# Patient Record
Sex: MALE | Race: WHITE | NOT HISPANIC OR LATINO | Employment: FULL TIME | URBAN - METROPOLITAN AREA
[De-identification: names, ages, dates, MRNs, and addresses within clinical notes are randomized per-mention and may not be internally consistent; named-entity substitution may affect disease eponyms.]

---

## 2018-02-28 ENCOUNTER — COMMUNICATION - HEALTHEAST (OUTPATIENT)
Dept: FAMILY MEDICINE | Facility: CLINIC | Age: 53
End: 2018-02-28

## 2018-03-15 ENCOUNTER — RECORDS - HEALTHEAST (OUTPATIENT)
Dept: ADMINISTRATIVE | Facility: OTHER | Age: 53
End: 2018-03-15

## 2018-04-06 ENCOUNTER — OFFICE VISIT - HEALTHEAST (OUTPATIENT)
Dept: FAMILY MEDICINE | Facility: CLINIC | Age: 53
End: 2018-04-06

## 2018-04-06 DIAGNOSIS — T63.461A WASP STING-INDUCED ANAPHYLAXIS: ICD-10-CM

## 2018-04-06 DIAGNOSIS — E29.1 HYPOGONADISM MALE: ICD-10-CM

## 2018-04-06 DIAGNOSIS — S09.93XS: ICD-10-CM

## 2018-04-06 DIAGNOSIS — M21.611 BUNION OF RIGHT FOOT: ICD-10-CM

## 2018-04-06 DIAGNOSIS — Z00.00 ROUTINE GENERAL MEDICAL EXAMINATION AT A HEALTH CARE FACILITY: ICD-10-CM

## 2018-04-06 DIAGNOSIS — T78.2XXA WASP STING-INDUCED ANAPHYLAXIS: ICD-10-CM

## 2018-04-06 ASSESSMENT — MIFFLIN-ST. JEOR: SCORE: 1369.7

## 2018-04-09 ENCOUNTER — AMBULATORY - HEALTHEAST (OUTPATIENT)
Dept: LAB | Facility: CLINIC | Age: 53
End: 2018-04-09

## 2018-04-09 DIAGNOSIS — E29.1 HYPOGONADISM MALE: ICD-10-CM

## 2018-04-09 DIAGNOSIS — Z00.00 ROUTINE GENERAL MEDICAL EXAMINATION AT A HEALTH CARE FACILITY: ICD-10-CM

## 2018-04-09 LAB
ALBUMIN SERPL-MCNC: 4.1 G/DL (ref 3.5–5)
ALP SERPL-CCNC: 58 U/L (ref 45–120)
ALT SERPL W P-5'-P-CCNC: 17 U/L (ref 0–45)
ANION GAP SERPL CALCULATED.3IONS-SCNC: 11 MMOL/L (ref 5–18)
AST SERPL W P-5'-P-CCNC: 21 U/L (ref 0–40)
BILIRUB SERPL-MCNC: 0.6 MG/DL (ref 0–1)
BUN SERPL-MCNC: 13 MG/DL (ref 8–22)
CALCIUM SERPL-MCNC: 9.8 MG/DL (ref 8.5–10.5)
CHLORIDE BLD-SCNC: 102 MMOL/L (ref 98–107)
CHOLEST SERPL-MCNC: 187 MG/DL
CO2 SERPL-SCNC: 26 MMOL/L (ref 22–31)
CREAT SERPL-MCNC: 0.81 MG/DL (ref 0.7–1.3)
ERYTHROCYTE [DISTWIDTH] IN BLOOD BY AUTOMATED COUNT: 12.1 % (ref 11–14.5)
FASTING STATUS PATIENT QL REPORTED: YES
FSH SERPL-ACNC: 19.4 MIU/ML (ref 0–12)
GFR SERPL CREATININE-BSD FRML MDRD: >60 ML/MIN/1.73M2
GLUCOSE BLD-MCNC: 81 MG/DL (ref 70–125)
HCT VFR BLD AUTO: 40.6 % (ref 40–54)
HDLC SERPL-MCNC: 66 MG/DL
HGB BLD-MCNC: 14 G/DL (ref 14–18)
LDLC SERPL CALC-MCNC: 101 MG/DL
LH SERPL-ACNC: 6.9 MIU/ML
MCH RBC QN AUTO: 30.8 PG (ref 27–34)
MCHC RBC AUTO-ENTMCNC: 34.5 G/DL (ref 32–36)
MCV RBC AUTO: 89 FL (ref 80–100)
PLATELET # BLD AUTO: 252 THOU/UL (ref 140–440)
PMV BLD AUTO: 7.3 FL (ref 7–10)
POTASSIUM BLD-SCNC: 4.1 MMOL/L (ref 3.5–5)
PROT SERPL-MCNC: 6.6 G/DL (ref 6–8)
PSA SERPL-MCNC: 2.7 NG/ML (ref 0–3.5)
RBC # BLD AUTO: 4.55 MILL/UL (ref 4.4–6.2)
SODIUM SERPL-SCNC: 139 MMOL/L (ref 136–145)
TRIGL SERPL-MCNC: 99 MG/DL
TSH SERPL DL<=0.005 MIU/L-ACNC: 3.09 UIU/ML (ref 0.3–5)
WBC: 5.1 THOU/UL (ref 4–11)

## 2018-04-10 LAB — 25(OH)D3 SERPL-MCNC: 83.7 NG/ML (ref 30–80)

## 2018-04-12 LAB
TESTOST SERPL-MCNC: 449 NG/DL (ref 240–950)
TESTOST SERPL-MCNC: 450 NG/DL (ref 240–950)
TESTOSTERONE, BIOAVAILABLE, S: 63 NG/DL (ref 50–190)
TESTOSTERONE, FREE, S - HISTORICAL: 9 NG/DL (ref 4.06–15.6)

## 2018-04-13 ENCOUNTER — COMMUNICATION - HEALTHEAST (OUTPATIENT)
Dept: FAMILY MEDICINE | Facility: CLINIC | Age: 53
End: 2018-04-13

## 2018-05-02 ENCOUNTER — RECORDS - HEALTHEAST (OUTPATIENT)
Dept: ADMINISTRATIVE | Facility: OTHER | Age: 53
End: 2018-05-02

## 2018-06-04 ENCOUNTER — RECORDS - HEALTHEAST (OUTPATIENT)
Dept: ADMINISTRATIVE | Facility: OTHER | Age: 53
End: 2018-06-04

## 2018-06-07 ENCOUNTER — COMMUNICATION - HEALTHEAST (OUTPATIENT)
Dept: FAMILY MEDICINE | Facility: CLINIC | Age: 53
End: 2018-06-07

## 2018-06-27 ENCOUNTER — OFFICE VISIT - HEALTHEAST (OUTPATIENT)
Dept: FAMILY MEDICINE | Facility: CLINIC | Age: 53
End: 2018-06-27

## 2018-06-27 DIAGNOSIS — Z01.818 PREOP GENERAL PHYSICAL EXAM: ICD-10-CM

## 2018-06-27 DIAGNOSIS — D10.1 FIBROMA OF TONGUE: ICD-10-CM

## 2018-06-27 ASSESSMENT — MIFFLIN-ST. JEOR: SCORE: 1349.29

## 2018-07-12 ENCOUNTER — RECORDS - HEALTHEAST (OUTPATIENT)
Dept: ADMINISTRATIVE | Facility: OTHER | Age: 53
End: 2018-07-12

## 2018-09-05 ENCOUNTER — OFFICE VISIT - HEALTHEAST (OUTPATIENT)
Dept: FAMILY MEDICINE | Facility: CLINIC | Age: 53
End: 2018-09-05

## 2018-09-05 DIAGNOSIS — M20.5X1 HALLUX LIMITUS OF RIGHT FOOT: ICD-10-CM

## 2018-09-05 DIAGNOSIS — Z01.818 PREOPERATIVE EXAMINATION: ICD-10-CM

## 2018-09-05 ASSESSMENT — MIFFLIN-ST. JEOR: SCORE: 1339.31

## 2018-09-06 ENCOUNTER — COMMUNICATION - HEALTHEAST (OUTPATIENT)
Dept: ADMINISTRATIVE | Facility: CLINIC | Age: 53
End: 2018-09-06

## 2018-09-19 ENCOUNTER — RECORDS - HEALTHEAST (OUTPATIENT)
Dept: ADMINISTRATIVE | Facility: OTHER | Age: 53
End: 2018-09-19

## 2020-11-24 ENCOUNTER — COMMUNICATION - HEALTHEAST (OUTPATIENT)
Dept: SCHEDULING | Facility: CLINIC | Age: 55
End: 2020-11-24

## 2020-11-25 ENCOUNTER — OFFICE VISIT - HEALTHEAST (OUTPATIENT)
Dept: FAMILY MEDICINE | Facility: CLINIC | Age: 55
End: 2020-11-25

## 2020-11-25 DIAGNOSIS — K57.32 DIVERTICULITIS OF COLON: ICD-10-CM

## 2020-12-01 ENCOUNTER — COMMUNICATION - HEALTHEAST (OUTPATIENT)
Dept: SCHEDULING | Facility: CLINIC | Age: 55
End: 2020-12-01

## 2020-12-02 ENCOUNTER — AMBULATORY - HEALTHEAST (OUTPATIENT)
Dept: LAB | Facility: HOSPITAL | Age: 55
End: 2020-12-02

## 2020-12-02 LAB
C DIFF TOX B STL QL: NEGATIVE
RIBOTYPE 027/NAP1/BI: NORMAL

## 2020-12-05 LAB — BACTERIA SPEC CULT: NORMAL

## 2021-01-07 ENCOUNTER — OFFICE VISIT - HEALTHEAST (OUTPATIENT)
Dept: FAMILY MEDICINE | Facility: CLINIC | Age: 56
End: 2021-01-07

## 2021-01-07 DIAGNOSIS — K57.32 DIVERTICULITIS OF COLON: ICD-10-CM

## 2021-01-07 DIAGNOSIS — Z85.828 HX OF SKIN CANCER, BASAL CELL: ICD-10-CM

## 2021-01-07 DIAGNOSIS — Z00.00 ROUTINE GENERAL MEDICAL EXAMINATION AT A HEALTH CARE FACILITY: ICD-10-CM

## 2021-01-07 DIAGNOSIS — E55.9 VITAMIN D DEFICIENCY: ICD-10-CM

## 2021-01-07 DIAGNOSIS — K64.9 HEMORRHOIDS, UNSPECIFIED HEMORRHOID TYPE: ICD-10-CM

## 2021-01-07 DIAGNOSIS — T78.2XXD ANAPHYLACTIC REACTION TO WASP STING, UNDETERMINED INTENT, SUBSEQUENT ENCOUNTER: ICD-10-CM

## 2021-01-07 DIAGNOSIS — N28.1 ACQUIRED CYST OF KIDNEY: ICD-10-CM

## 2021-01-07 DIAGNOSIS — E29.1 HYPOGONADISM MALE: ICD-10-CM

## 2021-01-07 DIAGNOSIS — R19.5 LOOSE STOOLS: ICD-10-CM

## 2021-01-07 DIAGNOSIS — T63.464D ANAPHYLACTIC REACTION TO WASP STING, UNDETERMINED INTENT, SUBSEQUENT ENCOUNTER: ICD-10-CM

## 2021-01-07 RX ORDER — EPINEPHRINE 0.3 MG/.3ML
0.3 INJECTION SUBCUTANEOUS PRN
Qty: 2 | Refills: 1 | Status: SHIPPED | OUTPATIENT
Start: 2021-01-07

## 2021-01-07 ASSESSMENT — MIFFLIN-ST. JEOR: SCORE: 1342.03

## 2021-01-12 ENCOUNTER — AMBULATORY - HEALTHEAST (OUTPATIENT)
Dept: LAB | Facility: CLINIC | Age: 56
End: 2021-01-12

## 2021-01-12 DIAGNOSIS — E29.1 HYPOGONADISM MALE: ICD-10-CM

## 2021-01-12 DIAGNOSIS — E55.9 VITAMIN D DEFICIENCY: ICD-10-CM

## 2021-01-12 DIAGNOSIS — Z00.00 ROUTINE GENERAL MEDICAL EXAMINATION AT A HEALTH CARE FACILITY: ICD-10-CM

## 2021-01-12 DIAGNOSIS — R19.5 LOOSE STOOLS: ICD-10-CM

## 2021-01-12 LAB
ALBUMIN SERPL-MCNC: 4.1 G/DL (ref 3.5–5)
ALP SERPL-CCNC: 57 U/L (ref 45–120)
ALT SERPL W P-5'-P-CCNC: 32 U/L (ref 0–45)
ANION GAP SERPL CALCULATED.3IONS-SCNC: 9 MMOL/L (ref 5–18)
AST SERPL W P-5'-P-CCNC: 30 U/L (ref 0–40)
BILIRUB SERPL-MCNC: 0.7 MG/DL (ref 0–1)
BUN SERPL-MCNC: 16 MG/DL (ref 8–22)
CALCIUM SERPL-MCNC: 9.6 MG/DL (ref 8.5–10.5)
CHLORIDE BLD-SCNC: 102 MMOL/L (ref 98–107)
CHOLEST SERPL-MCNC: 216 MG/DL
CO2 SERPL-SCNC: 29 MMOL/L (ref 22–31)
CREAT SERPL-MCNC: 0.96 MG/DL (ref 0.7–1.3)
ERYTHROCYTE [DISTWIDTH] IN BLOOD BY AUTOMATED COUNT: 11.5 % (ref 11–14.5)
FASTING STATUS PATIENT QL REPORTED: YES
GFR SERPL CREATININE-BSD FRML MDRD: >60 ML/MIN/1.73M2
GLUCOSE BLD-MCNC: 83 MG/DL (ref 70–125)
HCT VFR BLD AUTO: 41.7 % (ref 40–54)
HDLC SERPL-MCNC: 75 MG/DL
HGB BLD-MCNC: 14.3 G/DL (ref 14–18)
LDLC SERPL CALC-MCNC: 124 MG/DL
MCH RBC QN AUTO: 31.8 PG (ref 27–34)
MCHC RBC AUTO-ENTMCNC: 34.3 G/DL (ref 32–36)
MCV RBC AUTO: 93 FL (ref 80–100)
PLATELET # BLD AUTO: 273 THOU/UL (ref 140–440)
PMV BLD AUTO: 6.8 FL (ref 7–10)
POTASSIUM BLD-SCNC: 4.7 MMOL/L (ref 3.5–5)
PROT SERPL-MCNC: 6.5 G/DL (ref 6–8)
RBC # BLD AUTO: 4.5 MILL/UL (ref 4.4–6.2)
SODIUM SERPL-SCNC: 140 MMOL/L (ref 136–145)
TRIGL SERPL-MCNC: 85 MG/DL
TSH SERPL DL<=0.005 MIU/L-ACNC: 2.72 UIU/ML (ref 0.3–5)
WBC: 3.9 THOU/UL (ref 4–11)

## 2021-01-13 LAB — 25(OH)D3 SERPL-MCNC: 49.8 NG/ML (ref 30–80)

## 2021-01-14 LAB
SHBG SERPL-SCNC: 40 NMOL/L (ref 11–80)
TESTOST FREE SERPL-MCNC: 8.6 NG/DL (ref 4.7–24.4)
TESTOST SERPL-MCNC: 467 NG/DL (ref 240–950)

## 2021-01-15 ENCOUNTER — COMMUNICATION - HEALTHEAST (OUTPATIENT)
Dept: FAMILY MEDICINE | Facility: CLINIC | Age: 56
End: 2021-01-15

## 2021-01-26 ENCOUNTER — COMMUNICATION - HEALTHEAST (OUTPATIENT)
Dept: FAMILY MEDICINE | Facility: CLINIC | Age: 56
End: 2021-01-26

## 2021-01-27 ENCOUNTER — COMMUNICATION - HEALTHEAST (OUTPATIENT)
Dept: LAB | Facility: CLINIC | Age: 56
End: 2021-01-27

## 2021-02-08 ENCOUNTER — RECORDS - HEALTHEAST (OUTPATIENT)
Dept: ADMINISTRATIVE | Facility: OTHER | Age: 56
End: 2021-02-08

## 2021-03-02 ENCOUNTER — RECORDS - HEALTHEAST (OUTPATIENT)
Dept: ADMINISTRATIVE | Facility: OTHER | Age: 56
End: 2021-03-02

## 2021-04-06 ENCOUNTER — IMMUNIZATION (OUTPATIENT)
Dept: NURSING | Facility: CLINIC | Age: 56
End: 2021-04-06
Payer: COMMERCIAL

## 2021-04-06 PROCEDURE — 91300 PR COVID VAC PFIZER DIL RECON 30 MCG/0.3 ML IM: CPT

## 2021-04-06 PROCEDURE — 0001A PR COVID VAC PFIZER DIL RECON 30 MCG/0.3 ML IM: CPT

## 2021-04-20 ENCOUNTER — RECORDS - HEALTHEAST (OUTPATIENT)
Dept: ADMINISTRATIVE | Facility: OTHER | Age: 56
End: 2021-04-20

## 2021-04-27 ENCOUNTER — IMMUNIZATION (OUTPATIENT)
Dept: NURSING | Facility: CLINIC | Age: 56
End: 2021-04-27
Attending: INTERNAL MEDICINE
Payer: COMMERCIAL

## 2021-04-27 PROCEDURE — 0002A PR COVID VAC PFIZER DIL RECON 30 MCG/0.3 ML IM: CPT

## 2021-04-27 PROCEDURE — 91300 PR COVID VAC PFIZER DIL RECON 30 MCG/0.3 ML IM: CPT

## 2021-05-05 ENCOUNTER — RECORDS - HEALTHEAST (OUTPATIENT)
Dept: ADMINISTRATIVE | Facility: OTHER | Age: 56
End: 2021-05-05

## 2021-05-11 ENCOUNTER — RECORDS - HEALTHEAST (OUTPATIENT)
Dept: ADMINISTRATIVE | Facility: OTHER | Age: 56
End: 2021-05-11

## 2021-05-23 ENCOUNTER — HEALTH MAINTENANCE LETTER (OUTPATIENT)
Age: 56
End: 2021-05-23

## 2021-05-28 ENCOUNTER — RECORDS - HEALTHEAST (OUTPATIENT)
Dept: ADMINISTRATIVE | Facility: CLINIC | Age: 56
End: 2021-05-28

## 2021-06-01 ENCOUNTER — RECORDS - HEALTHEAST (OUTPATIENT)
Dept: ADMINISTRATIVE | Facility: CLINIC | Age: 56
End: 2021-06-01

## 2021-06-01 VITALS — BODY MASS INDEX: 19.23 KG/M2 | WEIGHT: 122.5 LBS | HEIGHT: 67 IN

## 2021-06-01 VITALS — BODY MASS INDEX: 19.93 KG/M2 | WEIGHT: 127 LBS | HEIGHT: 67 IN

## 2021-06-01 VITALS — HEIGHT: 67 IN | WEIGHT: 120.3 LBS | BODY MASS INDEX: 18.88 KG/M2

## 2021-06-05 VITALS
OXYGEN SATURATION: 99 % | HEART RATE: 57 BPM | WEIGHT: 122.13 LBS | DIASTOLIC BLOOD PRESSURE: 67 MMHG | BODY MASS INDEX: 19.13 KG/M2 | SYSTOLIC BLOOD PRESSURE: 124 MMHG

## 2021-06-05 VITALS
BODY MASS INDEX: 18.98 KG/M2 | HEART RATE: 62 BPM | DIASTOLIC BLOOD PRESSURE: 70 MMHG | WEIGHT: 120.9 LBS | SYSTOLIC BLOOD PRESSURE: 138 MMHG | HEIGHT: 67 IN

## 2021-06-13 NOTE — TELEPHONE ENCOUNTER
RN Triage:     Patient is calling in.   Seen on 11/25/20 for diverticulitis. Prescribed antibiotics and be seen again if not better.   Per patient he feels worse starting yesterday. He stated the left lower pain is more pronounced and more diarrhea. Abdominal pain is little stabs of pain that come and goes, that started on Saturday morning. He is having 3-4 episodes of diarrhea per day. NO blood or black tarry BMs.  Yesterday he started feeling overall not well. Chills and feeling ill. NO fever. NO vomiting, some nausea that he stated is mild and comes in waves. He is on a liquid diet again. Some shortness of breath that started yesterday. Tightness in the chest at rest that is constant, he stated it is an uneasy feeling, pain is center chest and is not made worse with exertion, it is present constantly. Patient stated dull pain in the chest. NO pain with deep breaths. Slightly labored breathing at rest, he stated not breathing harder or faster. NOt feeling anxious. Urinating fine, when bladder is full the pain in the left lower quadrant gets worse. Some pain that radiates into the scrotum but not constant. He stated he felt lightheaded starting yesterday with standing and feeling lightheaded today. He stated he is concerned about sepsis.      Per RN protocol patient should be seen in the ED. Patient agreed.   Advised to have someone drive him and wear a mask.     Rachel Swanson RN, BSN [facility] Triage Nurse    Reason for Disposition    Chest pain or pressure    MILD difficulty breathing (e.g., minimal/no SOB at rest, SOB with walking, pulse <100)    Patient wants to be seen    Difficulty breathing    Additional Information    Negative: SEVERE difficulty breathing (e.g., struggling for each breath, speaks in single words)    Negative: Difficult to awaken or acting confused (e.g., disoriented, slurred speech)    Negative: Bluish (or gray) lips or face now    Negative: Shock suspected (e.g., cold/pale/clammy  skin, too weak to stand, low BP, rapid pulse)    Negative: Sounds like a life-threatening emergency to the triager    Negative: [1] COVID-19 exposure AND [2] no symptoms    Negative: COVID-19 and Breastfeeding, questions about    Negative: [1] Adult with possible COVID-19 symptoms AND [2] triager concerned about severity of symptoms or other causes    Negative: Patient sounds very sick or weak to the triager    Negative: SEVERE or constant chest pain or pressure (Exception: mild central chest pain, present only when coughing)    Negative: MODERATE difficulty breathing (e.g., speaks in phrases, SOB even at rest, pulse 100-120)    Negative: Chest pain    Negative: Pain is mainly in upper abdomen (if needed ask: 'is it mainly above the belly button?')    Negative: Passed out (i.e., fainted, collapsed and was not responding)    Negative: Shock suspected (e.g., cold/pale/clammy skin, too weak to stand, low BP, rapid pulse)    Negative: Sounds like a life-threatening emergency to the triager    Negative: SEVERE abdominal pain (e.g., excruciating)    Negative: Vomiting red blood or black (coffee ground) material    Negative: Bloody, black, or tarry bowel movements    Negative: Unable to urinate (or only a few drops) and bladder feels very full    Negative: Pain in scrotum persists > 1 hour    Negative: Constant abdominal pain lasting > 2 hours    Negative: Vomiting bile (green color)    Negative: Patient sounds very sick or weak to the triager    Negative: Age > 60 years    Negative: MILD pain that comes and goes (cramps) lasts > 24 hours    Negative: Vomiting and abdomen looks much more swollen than usual    Negative: White of the eyes have turned yellow (i.e., jaundice)    Negative: Blood in urine (red, pink, or tea-colored)    Negative: Fever > 103 F (39.4 C)    Negative: Fever > 101 F (38.3 C) and over 60 years of age    Negative: Fever > 100.0 F (37.8 C) and has diabetes mellitus or a weak immune system (e.g., HIV  positive, cancer chemotherapy, organ transplant, splenectomy, chronic steroids)    Negative: Fever > 100.0 F (37.8 C) and bedridden (e.g., nursing home patient, stroke, chronic illness, recovering from surgery)    Negative: SEVERE difficulty breathing (e.g., struggling for each breath, speaks in single words)    Negative: Passed out (i.e., fainted, collapsed and was not responding)    Negative: Chest pain lasting longer than 5 minutes and ANY of the following:* Over 50 years old* Over 30 years old and at least one cardiac risk factor (i.e., high blood pressure, diabetes, high cholesterol, obesity, smoker or strong family history of heart disease)* Pain is crushing, pressure-like, or heavy * Took nitroglycerin and chest pain was not relieved* History of heart disease (i.e., angina, heart attack, bypass surgery, angioplasty, CHF)    Negative: Visible sweat on face or sweat dripping down face    Negative: Sounds like a life-threatening emergency to the triager    Negative: Followed an injury to chest    Negative: SEVERE chest pain    Negative: Pain also present in shoulder(s) or arm(s) or jaw    Protocols used: CORONAVIRUS (COVID-19) DIAGNOSED OR [id number] 8.4.20, ABDOMINAL PAIN - MALE-A-OH, CHEST PAIN-A-OH

## 2021-06-13 NOTE — PROGRESS NOTES
Assessment:   1. Diverticulitis of colon  Discussed possible diagnosis and treatment plan.   - ciprofloxacin HCl (CIPRO) 500 MG tablet; Take 1 tablet (500 mg total) by mouth 2 (two) times a day for 7 days.  Dispense: 14 tablet; Refill: 0  - metroNIDAZOLE (FLAGYL) 500 MG tablet; Take 1 tablet (500 mg total) by mouth 3 (three) times a day for 7 days.  Dispense: 21 tablet; Refill: 0     Plan:   1. Recommend rest and increased fluids.  Antibiotics as ordered above. Side effects of the antibiotics were discussed, including the possibility of rash, photosensitivity, gastrointestinal upset, and incompatibility with alcohol.    2. Patient is instructed to avoid nuts and seeds.    3. He will call if his symptoms worsen, new problems develop, intractable nausea/vomiting occurs, fever of 101.0 (orally) or greater occurs, or if all symptoms are not dramatically improved in 48 hours and completely resolved in 5 days. The patient understands that hospitalization for intravenous fluids and/or antibiotics may be necessary if his symptoms worsen or persist.     4. Follow up in 2 weeks or sooner as needed.     Subjective:   Venancio Baxter is an 55 y.o. male who presents for evaluation of diarrhea and LLQ abdominal pain. The pain is described as aching, and is a 4/10 in intensity. Onset was 2 weeks ago. Symptoms have been unchanged since that time. Associated symptoms: diarrhea and frequency. Aggravating factors: none. Alleviating factors: none. The patient denies anorexia, chills, constipation, fever, night sweats and vomiting.    The following portions of the patient's history were reviewed and updated as appropriate: allergies, current medications, past family history, past medical history, past social history, past surgical history and problem list.    Review of Systems  A 12 point comprehensive review of systems was negative except as noted.       Objective:      /67   Pulse (!) 57   Wt 122 lb 2 oz (55.4 kg)   SpO2 99%    BMI 19.13 kg/m    General appearance: alert, appears stated age and cooperative    Abdomen: abnormal findings:  mild tenderness in the LLQ

## 2021-06-13 NOTE — TELEPHONE ENCOUNTER
Triage Call:    Pt is calling stating for the last 2 weeks he has been having watery stools. Hx of Diverticulitis. 3-4 stools a day, no blood or black in stool, no fever. Bloating. Drinking well. No nausea or vomiting. Pt stated he had occ pain/discomfort with food passing in stomach. Urinating normally. Pt is unsure if he should use anti-diarrheal medication. Pt tried a soft diet-did not help. Pt has been trying anti-inflammatories. Pt was advised to be seen in the next 24 hours. Pt does not want to go to and Urgent care if possible. Pt stated his PCP is booked up but he would like to see one of the other providers at the clinic. Pt was connected with a  to make an appointment.     COVID 19 Nurse Triage Plan/Patient Instructions    Please be aware that novel coronavirus (COVID-19) may be circulating in the community. If you develop symptoms such as fever, cough, or SOB or if you have concerns about the presence of another infection including coronavirus (COVID-19), please contact your health care provider or visit [email or web address].     Disposition/Instructions    In-Person Visit with provider recommended. Reference Visit Selection Guide.    Thank you for taking steps to prevent the spread of this virus.  o Limit your contact with others.  o Wear a simple mask to cover your cough.  o Wash your hands well and often.    Resources    [facility]: About COVID-19: [email or web address]    CDC: What to Do If You're Sick: www.cdc.gov/coronavirus/2019-ncov/about/steps-when-sick.html    CDC: Ending Home Isolation: www.cdc.gov/coronavirus/2019-ncov/hcp/disposition-in-home-patients.html     CDC: Caring for Someone: www.cdc.gov/coronavirus/2019-ncov/if-you-are-sick/care-for-someone.html     [facility]: Interim Guidance for Hospital Discharge to Home: www.health.[facility].[place]/diseases/coronavirus/hcp/hospdischarge.pdf    [facility] clinical trials (COVID-19 research studies):  [email or web address]-clinical-trials     Below are the COVID-19 hotlines at the [place] Department of Health ([facility]). Interpreters are available.   o For health questions: Call 937-651-9078 or 1-796.496.8360 (7 a.m. to 7 p.m.)  o For questions about schools and childcare: Call 023-791-3765 or 1-925.664.1737 (7 a.m. to 7 p.m.)   o   Marie Nugent RN Nurse Triage 11/24/2020 5:02 PM     Reason for Disposition    [1] MODERATE diarrhea (e.g., 4-6 times / day more than normal) AND [2] present > 48 hours (2 days)    Additional Information    Negative: Shock suspected (e.g., cold/pale/clammy skin, too weak to stand, low BP, rapid pulse)    Negative: Difficult to awaken or acting confused (e.g., disoriented, slurred speech)    Negative: Sounds like a life-threatening emergency to the triager    Negative: Vomiting also present and worse than the diarrhea    Negative: [1] Blood in stool AND [2] without diarrhea    Negative: Diarrhea in a cancer patient who is currently (or recently) receiving chemotherapy or radiation therapy, or cancer patient who has metastatic or end-stage cancer and is receiving palliative care    Negative: [1] SEVERE abdominal pain (e.g., excruciating) AND [2] present > 1 hour    Negative: [1] SEVERE abdominal pain AND [2] age > 60    Negative: [1] Blood in the stool AND [2] moderate or large amount of blood    Negative: Black or tarry bowel movements  (Exception: chronic-unchanged  black-grey bowel movements AND is taking iron pills or Pepto-bismol)    Negative: [1] Drinking very little AND [2] dehydration suspected (e.g., no urine > 12 hours, very dry mouth, very lightheaded)    Negative: Patient sounds very sick or weak to the triager    Negative: [1] SEVERE diarrhea (e.g., 7 or more times / day more than normal) AND [2] age > 60 years    Negative: [1] Constant abdominal pain AND [2] present > 2 hours    Negative: [1] Fever > 103 F (39.4 C) AND [2] not able to get the fever down using Fever Care  Advice    Negative: [1] SEVERE diarrhea (e.g., 7 or more times / day more than normal) AND [2] present > 24 hours (1 day)    Protocols used: DIARRHEA-A-AH

## 2021-06-14 NOTE — TELEPHONE ENCOUNTER
Reason for Call:  Other      Detailed comments: Vitamin D lab denied by insurance--  Not covered unless there is a deficiency or excessive level that is explaining necessity the test.     Needing documentation sent to insurance to provide this information.  Can provide directly to patient has he did not have the contact information for his [employer].      Phone Number Patient can be reached at:   Cell number on file:    Telephone Information:   Mobile [phone]       Best Time: any    Can we leave a detailed message on this number?: Yes    Call taken on 1/26/2021 at 12:19 PM by Laura L Goldberg

## 2021-06-14 NOTE — TELEPHONE ENCOUNTER
Patient informed of clinician's message. I spoke with billing and to start pre-authorization to see if insurance will cover lab, they needed a letter from Dr. Murray as to why patient got Vitamin D testing and necessity. Would you be able to write letter?    Please fax to billing when letter is completed: [phone]

## 2021-06-14 NOTE — PROGRESS NOTES
ASSESSMENT:  1. Routine general medical examination at a health care facility     - Lipid Cascade; Future  - Vitamin D, Total (25-Hydroxy); Future  - Comprehensive Metabolic Panel; Future    2. Anaphylactic reaction to wasp sting, undetermined intent, subsequent encounter     - EPINEPHrine (EPIPEN/ADRENACLICK/AUVI-Q) 0.3 mg/0.3 mL injection; Inject 0.3 mL (0.3 mg total) as directed as needed for anaphylaxis. Inject into thigh.  Dispense: 2 Pre-filled Pen Syringe; Refill: 1    3. Diverticulitis of colon     - Ambulatory referral to Gastroenterology    4. Multiple Renal Cysts       5. Hx of skin cancer, basal cell       6. Hemorrhoids, unspecified hemorrhoid type       7. Hypogonadism male     - Testosterone, Free and Total; Future    8. Loose stools     - Ambulatory referral to Gastroenterology  - Vitamin D, Total (25-Hydroxy); Future  - Thyroid Stimulating Hormone (TSH); Future  - HM2(CBC w/o Differential); Future    9. Vitamin D deficiency     - Vitamin D, Total (25-Hydroxy); Future           PLAN:  There are no Patient Instructions on file for this visit.    Orders Placed This Encounter   Procedures     Testosterone, Free and Total     Standing Status:   Future     Standing Expiration Date:   1/7/2022     Lipid Cascade     Standing Status:   Future     Standing Expiration Date:   1/7/2022     Order Specific Question:   Fasting is required?     Answer:   Yes     Vitamin D, Total (25-Hydroxy)     Standing Status:   Future     Standing Expiration Date:   1/7/2022     Comprehensive Metabolic Panel     Standing Status:   Future     Standing Expiration Date:   1/7/2022     Thyroid Stimulating Hormone (TSH)     Standing Status:   Future     Standing Expiration Date:   1/7/2022     HM2(CBC w/o Differential)     Standing Status:   Future     Standing Expiration Date:   1/7/2022     Ambulatory referral to Gastroenterology     Referral Priority:   Urgent     Referral Type:   Consultation     Referral Reason:   Evaluation and  Treatment     [facility] Location[facility]   [facility]     Requested Specialty:   Gastroenterology     Number of Visits Requested:   1     Medications Discontinued During This Encounter   Medication Reason     EPINEPHrine (EPIPEN/ADRENACLICK) 0.3 mg/0.3 mL injection Reorder       No follow-ups on file.    Health Maintenance Due   Topic Date Due     HEPATITIS C SCREENING  1966     HIV SCREENING  03/30/1981     ZOSTER VACCINES (1 of 2) 03/30/2016     PREVENTIVE CARE VISIT  04/06/2019     INFLUENZA VACCINE RULE BASED (1) 08/01/2020         CHIEF COMPLAINT:  Chief Complaint   Patient presents with     Annual Exam     diverticulitis         HISTORY OF PRESENT ILLNESS:  Venancio Baxter is a 55 y.o. male presenting to the clinic today for a physical exam.     He has ongoing problems with diverticular disease and says that he tends to have a bout of diverticulitis about every few years and he gets antibiotics.  He said his last episode was probably about 2 to 3 years ago.  Often he tries to handle it himself with a soft diet and some Advil and it tends to clear up on its own.  This time before Thanksgiving it was different he had a little bit of left lower quadrant discomfort in late October or November and his usual manner of dealing with this was not effective.  He had loose stools and diarrhea but he did not have any fever or pain like he typically does.  After about 3 weeks of trying to manage it himself he got a prescription for antibiotics from our nurse practitioner and that did not seem to completely cure things so despite trying to go on a liquid diet after Thanksgiving, he had more trouble and went to the emergency department on December 1.  Evaluation there was fairly unremarkable and the CAT scan showed diverticulosis without leela diverticulitis.    He still feels as though he is having loose stools although no blood in the stool, and that he still cannot eat normal diet because anything he eats  he will have loose stools and he feels as though things are still inflamed.    He is adopted so he does not know his family history.  It looks as though last colonoscopy was in 2010.  It had been mentioned to him that 6 weeks after diverticular infection he needs to have another colonoscopy so he knows he needs that but he is interested in having an intellectual consult as well because he is wondering about irritable bowel or inflammatory bowel disease as well.    We will set up fasting labs next week so he can make sure the rest of his health care maintenance has been addressed.    He has a very stressful job through the [employer] as a  and he is working on a project that is a large project for the [employer] and is also working on a project at the national level as well.    Healthy Habits  Are you taking a daily aspirin? No  Do you typically exercising at least 40 min, 3-4 times per week?  Yes  Do you usually eat at least 4 servings of fruit and vegetables a day, include whole grains and fiber and avoid regularly eating high fat foods? Yes  Have you had an eye exam in the past two years? Not sure  Do you see a dentist twice per year? Yes  Do you have any concerns regarding sleep? No  Do you drink caffeine? YES    Safety Screen  If you own firearms, are they secured in a locked gun cabinet or with trigger locks?     REVIEW OF SYSTEMS:   All other systems are negative.    PFSH:  Unknown, adopted  Social History     Tobacco Use   Smoking Status Never Smoker   Smokeless Tobacco Never Used     Family History   Adopted: Yes     Social History     Socioeconomic History     Marital status:      Spouse name: Not on file     Number of children: 2     Years of education: Not on file     Highest education level: Not on file   Occupational History     Occupation:      Employer: [employer]   Social Needs     Financial resource strain: Not on file     Food insecurity     Worry: Not  on file     Inability: Not on file     Transportation needs     Medical: Not on file     Non-medical: Not on file   Tobacco Use     Smoking status: Never Smoker     Smokeless tobacco: Never Used   Substance and Sexual Activity     Alcohol use: Yes     Comment: occasional     Drug use: No     Sexual activity: Yes     Birth control/protection: None   Lifestyle     Physical activity     Days per week: Not on file     Minutes per session: Not on file     Stress: Not on file   Relationships     Social connections     Talks on phone: Not on file     Gets together: Not on file     Attends Christian service: Not on file     Active member of club or organization: Not on file     Attends meetings of clubs or organizations: Not on file     Relationship status: Not on file     Intimate partner violence     Fear of current or ex partner: Not on file     Emotionally abused: Not on file     Physically abused: Not on file     Forced sexual activity: Not on file   Other Topics Concern     Not on file   Social History Narrative    [employer]     Past Surgical History:   Procedure Laterality Date     HERNIA REPAIR      right inguinal; age 14, recurred 2000 @[place]     [place] ABDOMEN SURGERY PROC UNLISTED      Description: Hernia Repair;  Recorded: 07/16/2008;     [place] REMOVAL SYNOVIAL CYST,KNEE      Description: Excision Of Baker's Cyst;  Recorded: 09/24/2014;  Comments: X3     No Known Allergies  Active Ambulatory Problems     Diagnosis Date Noted     Hypogonadism male      Multiple Renal Cysts      Diverticulosis      Hx of skin cancer, basal cell      Hemorrhoids      Wasp sting-induced anaphylaxis      Fibroma of tongue 06/27/2018     Resolved Ambulatory Problems     Diagnosis Date Noted     Cough      Sacroiliac Region Strain      Burning Sensation (Dysesthesia)      Past Medical History:   Diagnosis Date     Hypogonadism in male        VITALS:  Vitals:    01/07/21 1703   BP: 138/70   Patient Site: Right Arm   Patient Position: Sitting  "  Cuff Size: Adult Regular   Pulse: 62   Weight: 120 lb 14.4 oz (54.8 kg)   Height: 5' 7\" (1.702 m)     BP Readings from Last 3 Encounters:   01/07/21 138/70   12/01/20 122/75   11/25/20 124/67     Wt Readings from Last 3 Encounters:   01/07/21 120 lb 14.4 oz (54.8 kg)   12/01/20 120 lb (54.4 kg)   11/25/20 122 lb 2 oz (55.4 kg)     Body mass index is 18.94 kg/m .    PHYSICAL EXAM:  General Appearance: Alert, cooperative, no distress, appears stated age.  Head: Normocephalic, without obvious abnormality, atraumatic  Eyes: PERRL, conjunctiva/corneas clear, EOM's intact  Ears: Normal TM's and external ear canals, both ears  Nose: Nares normal, septum midline,mucosa normal, no drainage  Throat:  Masked; has seen dentist  Neck: Supple, symmetrical, trachea midline, no adenopathy;  thyroid: not enlarged, symmetric, no tenderness/mass/nodules;    Back: Symmetric, no curvature, ROM normal, no CVA tenderness  Lungs: Clear to auscultation bilaterally, respirations unlabored  Heart: Regular rate and rhythm, S1 and S2 normal, no murmur, rub, or gallop, Abdomen: Soft, non-tender, bowel sounds active all four quadrants,  no masses, no organomegaly  Extremities: Extremities normal, atraumatic, no cyanosis or edema  Skin: Skin color, texture, turgor normal, no rashes or lesions  Lymph nodes: Cervical, supraclavicular, and axillary nodes normal  Neurologic: Normal    MEDICATIONS:  Current Outpatient Medications   Medication Sig Dispense Refill     ascorbic acid (VITAMIN C) 1,000 mg TbER Take 2,000 mg by mouth 2 (two) times a day.       cholecalciferol, vitamin D3, (VITAMIN D3) 2,000 unit Tab Take 4,000 Units by mouth.       EPINEPHrine (EPIPEN/ADRENACLICK/AUVI-Q) 0.3 mg/0.3 mL injection Inject 0.3 mL (0.3 mg total) as directed as needed for anaphylaxis. Inject into thigh. 2 Pre-filled Pen Syringe 1     ibuprofen (ADVIL,MOTRIN) 200 MG tablet Take 200 mg by mouth every 6 (six) hours as needed for pain.       multivitamin therapeutic " (THERAGRAN) tablet Take 1 tablet by mouth daily.       NAD, REDUCED, DISODIUM, BULK, MISC Use As Directed.       No current facility-administered medications for this visit.

## 2021-06-16 PROBLEM — D10.1 FIBROMA OF TONGUE: Status: ACTIVE | Noted: 2018-06-27

## 2021-06-17 NOTE — PROGRESS NOTES
ASSESSMENT:   1. Routine general medical examination at a health care facility     - Lipid Cascade; Future    2. Wasp sting-induced anaphylaxis  Renewed epi-pen    3. Hypogonadism male  He went off the Clomid probably about a year ago.  Half a dose of Clomid brought the testosterone level up a little bit a full 50 mg dose but it up pretty significantly.  Overall his general energy was better when he was on it his muscle tone was better and it was easier for him to manage his weight.  He really does feel best when his weight is in this range even though the BMI is on the very lean side he still feels better doing that.  We decided to see what his baseline labs are again being off the medication for about a year and half    - Lipid Cascade; Future  - Comprehensive Metabolic Panel; Future  - Thyroid Stimulating Hormone (TSH); Future  - Vitamin D, Total (25-Hydroxy); Future  - HM2(CBC w/o Differential); Future  - Follicle Stimulating Hormone (FSH); Future  - Luteinizing Hormone (LH); Future  - PSA, Annual Screen (Prostatic-Specific Antigen); Future  - Testosterone, Total and Bioavailable; Future  - Testosterone, Total and Free; Future    4. Tongue injury, sequela  He said that couple of years ago, simply wait chewing enthusiastically he bit his tongue and it was flapping so much that when he went to the emergency room they did decide to stitch it.  There is a defect in his tongue it does sometimes get caught in his teeth so are looking to see if there is anything in ear nose and throat surgeon could do to revise that.  - Ambulatory referral to ENT    5. Bunion of right foot  He has pain every day and has difficulty wearing shoes and finding shoes that fit well.  We talked a little bit about what this would entail and he may not decide to do it until the fall but still want him to have a consult as to what it would entail and how to approach that.  - Ambulatory referral to Podiatry    He has had a lot of stress in the  past and went to a psychiatrist to have him on medication for a while but the situational stressors have resolved and he is no longer taking that.    PLAN:  There are no Patient Instructions on file for this visit.    Orders Placed This Encounter   Procedures     Lipid Cascade     Standing Status:   Future     Standing Expiration Date:   4/6/2019     Order Specific Question:   Fasting is required?     Answer:   Yes     Comprehensive Metabolic Panel     Standing Status:   Future     Standing Expiration Date:   4/6/2019     Thyroid Stimulating Hormone (TSH)     Standing Status:   Future     Standing Expiration Date:   4/6/2019     Vitamin D, Total (25-Hydroxy)     Standing Status:   Future     Standing Expiration Date:   4/6/2019     HM2(CBC w/o Differential)     Standing Status:   Future     Standing Expiration Date:   4/6/2019     Follicle Stimulating Hormone (FSH)     Standing Status:   Future     Standing Expiration Date:   4/6/2019     Luteinizing Hormone (LH)     Standing Status:   Future     Standing Expiration Date:   4/6/2019     PSA, Annual Screen (Prostatic-Specific Antigen)     Standing Status:   Future     Standing Expiration Date:   4/6/2019     Testosterone, Total and Bioavailable     Standing Status:   Future     Standing Expiration Date:   4/6/2019     Testosterone, Total and Free     Standing Status:   Future     Standing Expiration Date:   4/6/2019     Ambulatory referral to Podiatry     Referral Priority:   Routine     Referral Type:   Consultation     Referral Reason:   Evaluation and Treatment     Referral Location:   [facility]     Requested Specialty:   Podiatry     Number of Visits Requested:   1     Ambulatory referral to ENT     Referral Priority:   Routine     Referral Type:   Consultation     Referral Reason:   Evaluation and Treatment     Referral Location:   [facility]     Requested Specialty:   Otolaryngology     Number of Visits Requested:   1      Medications Discontinued During This Encounter   Medication Reason     clomiPHENE (CLOMID) 50 mg tablet Therapy completed     escitalopram oxalate (LEXAPRO) 10 MG tablet Therapy completed       No Follow-up on file.    Health Maintenance Due   Topic Date Due     ADVANCE DIRECTIVES DISCUSSED WITH PATIENT  03/30/1984         CHIEF COMPLAINT:  Chief Complaint   Patient presents with     Annual Exam     not fasting     Foot Problem     right foot, possible bunion x a couple years     Facial Laceration     tongue laceration 2-3 years ago, still catches on teeth         HISTORY OF PRESENT ILLNESS:  Venancio Baxter is a 53 y.o. male presenting to the clinic today for a physical exam.     He was to see what his baseline labs are off of the Clomid.  He took 25 mg and then 50 mg and we are checking labs.  He felt better when he was on it because his testosterone level was higher and now he has been on it for at least a year he does not feel quite as good so want to see what the baseline is in probably entertain the idea of going back on something.    He had a tongue laceration a couple years ago and it has healed asymmetrically he would like to see if there is something that can be done to revise that.    He has more pain in the bunion on his right foot so wants to know what is next steps would be to get that remedied.    Healthy Habits:   Patient reports regular exercise, dental and eye exams. Uses healthy diet. Always uses seatbelts. Reports uses medications as directed.  Alcohol: occasional  Smoking: none  Caffeine use: 1-2 coffees and 1-2 sodas per day  Drug use: none  Birth control: none    REVIEW OF SYSTEMS:   All other systems are negative.    PFSH:     History   Smoking Status     Never Smoker   Smokeless Tobacco     Never Used     History reviewed. No pertinent family history.  Social History     Social History     Marital status:      Spouse name: N/A     Number of children: N/A     Years of education:  "N/A     Occupational History     Not on file.     Social History Main Topics     Smoking status: Never Smoker     Smokeless tobacco: Never Used     Alcohol use Yes      Comment: occasional     Drug use: No     Sexual activity: Yes     Birth control/ protection: None     Other Topics Concern     Not on file     Social History Narrative     Past Surgical History:   Procedure Laterality Date     [place] ABDOMEN SURGERY PROC UNLISTED      Description: Hernia Repair;  Recorded: 07/16/2008;     [place] REMOVAL SYNOVIAL CYST,KNEE      Description: Excision Of Baker's Cyst;  Recorded: 09/24/2014;  Comments: X3     No Known Allergies  Active Ambulatory Problems     Diagnosis Date Noted     Hypogonadism male      Multiple Renal Cysts      Diverticulosis      Basal Cell Adenocarcinoma      Hemorrhoids      Wasp sting-induced anaphylaxis      Resolved Ambulatory Problems     Diagnosis Date Noted     Cough      Sacroiliac Region Strain      Burning Sensation (Dysesthesia)      No Additional Past Medical History       VITALS:  Vitals:    04/06/18 1418   BP: 112/68   Patient Site: Right Arm   Patient Position: Sitting   Cuff Size: Adult Regular   Pulse: 76   Weight: 127 lb (57.6 kg)   Height: 5' 7\" (1.702 m)     BP Readings from Last 3 Encounters:   04/06/18 112/68   11/07/16 122/78   04/25/16 122/60     Wt Readings from Last 3 Encounters:   04/06/18 127 lb (57.6 kg)   11/07/16 135 lb 9.6 oz (61.5 kg)   04/25/16 130 lb 8 oz (59.2 kg)     Body mass index is 19.89 kg/(m^2).    PHYSICAL EXAM:  General Appearance: Alert, cooperative, no distress, appears stated age  Head: Normocephalic, without obvious abnormality, atraumatic  Eyes: PERRL, conjunctiva/corneas clear, EOM's intact. Glasses   Ears: Normal TM's and external ear canals, both ears  Nose: Nares normal, septum midline,mucosa normal, no drainage  Throat: Lips, mucosa, and tongue normal; teeth and gums normal  Neck: Supple, symmetrical, trachea midline, no adenopathy;  thyroid: not " enlarged, symmetric, no tenderness/mass/nodules   Back: Symmetric, no curvature, ROM normal, no CVA tenderness  Lungs: Clear to auscultation bilaterally, respirations unlabored  Heart: Regular rate and rhythm, S1 and S2 normal, no murmur, rub, or gallop, Abdomen: Soft, non-tender, bowel sounds active all four quadrants,  no masses, no organomegaly  Extremities: Extremities normal, atraumatic, no cyanosis or edema  Skin: Skin color, texture, turgor normal, no rashes or lesions. Recently saw Dermatologist  Lymph nodes: Cervical, supraclavicular, and axillary nodes normal  Neurologic: Normal     QUALITY MEASURES:  I have had an Advance Directives discussion with the patient.      MEDICATIONS:  Current Outpatient Prescriptions   Medication Sig Dispense Refill     ascorbic acid (VITAMIN C) 1,000 mg TbER Take 2,000 mg by mouth 2 (two) times a day.       cholecalciferol, vitamin D3, (VITAMIN D3) 2,000 unit Tab Take 4,000 Units by mouth.       ibuprofen (ADVIL,MOTRIN) 200 MG tablet Take 200 mg by mouth every 6 (six) hours as needed for pain.       multivitamin therapeutic (THERAGRAN) tablet Take 1 tablet by mouth daily.       EPINEPHrine (EPIPEN/ADRENACLICK) 0.3 mg/0.3 mL injection Inject 0.3 mL (0.3 mg total) as directed as needed for anaphylaxis. Inject into thigh. 2 Pre-filled Pen Syringe 1     No current facility-administered medications for this visit.

## 2021-06-18 NOTE — PROGRESS NOTES
Preoperative Exam    Scheduled Procedure: tongue laceration repair  Surgery Date:  7/5/18  Surgery Location: [facility], fax [phone]    Surgeon:  Dr. Mackenzie Hurt    History of tongue laceration that healed with scarring-fibroma; now it interferes with chewing and catches on front teeth    Assessment/Plan:     1. Fibroma of tongue       2. Preop general physical exam           Surgical Procedure Risk: Low (reported cardiac risk generally < 1%)  Have you had prior anesthesia?: Yes  Have you or any family members had a previous anesthesia reaction:  No  Do you or any family members have a history of a clotting or bleeding disorder?: unknown  Cardiac Risk Assessment: no increased risk for major cardiac complications    Patient approved for surgery with general or local anesthesia.         Functional Status: Independent  Patient plans to recover at home with family.     Subjective:      Venancio Baxter is a 53 y.o. male who presents for a preoperative consultation.  Years ago a tongue laceration didn't heal correctly, so now it is getting in the way of chewing.    All other systems reviewed and are negative, other than those listed in the HPI.    Pertinent History  Do you have difficulty breathing or chest pain after walking up a flight of stairs: No  History of obstructive sleep apnea: No  Steroid use in the last 6 months: No  Frequent Aspirin/NSAID use: Yes: Ibuprofen a few times a week; will stop 1 week prior to surgery  Prior Blood Transfusion: No  Prior Blood Transfusion Reaction: No  If for some reason prior to, during or after the procedure, if it is medically indicated, would you be willing to have a blood transfusion?:  There is no transfusion refusal.    Current Outpatient Prescriptions   Medication Sig Dispense Refill     ascorbic acid (VITAMIN C) 1,000 mg TbER Take 2,000 mg by mouth 2 (two) times a day.       cholecalciferol, vitamin D3, (VITAMIN D3) 2,000 unit Tab Take 4,000 Units by  "mouth.       ibuprofen (ADVIL,MOTRIN) 200 MG tablet Take 200 mg by mouth every 6 (six) hours as needed for pain.       multivitamin therapeutic (THERAGRAN) tablet Take 1 tablet by mouth daily.       NAD, REDUCED, DISODIUM, BULK, MISC Use As Directed.       EPINEPHrine (EPIPEN/ADRENACLICK) 0.3 mg/0.3 mL injection Inject 0.3 mL (0.3 mg total) as directed as needed for anaphylaxis. Inject into thigh. 2 Pre-filled Pen Syringe 1     No current facility-administered medications for this visit.         No Known Allergies    Patient Active Problem List   Diagnosis     Hypogonadism male     Multiple Renal Cysts     Diverticulosis     Basal Cell Adenocarcinoma     Hemorrhoids     Wasp sting-induced anaphylaxis       Past Medical History:   Diagnosis Date     Hypogonadism in male        Past Surgical History:   Procedure Laterality Date     [place] ABDOMEN SURGERY PROC UNLISTED      Description: Hernia Repair;  Recorded: 07/16/2008;     [place] REMOVAL SYNOVIAL CYST,KNEE      Description: Excision Of Baker's Cyst;  Recorded: 09/24/2014;  Comments: X3       Social History     Social History     Marital status:      Spouse name: N/A     Number of children: 2     Years of education: N/A     Occupational History      [employer]     Social History Main Topics     Smoking status: Never Smoker     Smokeless tobacco: Never Used     Alcohol use Yes      Comment: occasional     Drug use: No     Sexual activity: Yes     Birth control/ protection: None     Other Topics Concern     Not on file     Social History Narrative    Christianity       Patient Care Team:  Audra Murray MD as PCP - General          Objective:     Vitals:    06/27/18 1336   BP: 112/68   Pulse: 60   Weight: 122 lb 8 oz (55.6 kg)   Height: 5' 7\" (1.702 m)         Physical Exam:  General Appearance: Alert, cooperative, no distress, appears stated age  Head: Normocephalic, without obvious abnormality, atraumatic  Eyes: PERRL, conjunctiva/corneas clear, " EOM's intact  Ears: Normal TM's and external ear canals, both ears  Nose: Nares normal, septum midline,mucosa normal, no drainage  Throat: Lips, mucosa, and tongue normal; teeth and gums normal  Neck: Supple, symmetrical, trachea midline, no adenopathy;  thyroid: not enlarged, symmetric, no tenderness/mass/nodules; no carotid bruit or JVD  Back: Symmetric, no curvature, ROM normal, no CVA tenderness  Lungs: Clear to auscultation bilaterally, respirations unlabored  Heart: Regular rate and rhythm, S1 and S2 normal, no murmur, rub, or gallop, Abdomen: Soft, non-tender, bowel sounds active all four quadrants,  no masses, no organomegaly  Extremities: Extremities normal, atraumatic, no cyanosis or edema  Skin: Skin color, texture, turgor normal, no rashes or lesions  Lymph nodes: Cervical, supraclavicular, and axillary nodes normal  Neurologic: Normal       No labs indicated    Immunization History   Administered Date(s) Administered     Influenza, seasonal,quad inj 36+ mos 11/07/2016     Influenza, seasonal,quad inj 6-35 mos 12/10/2009     Influenza,seasonal quad, PF 09/24/2014     Influenza,seasonal quad, PF, 36+MOS 09/25/2013, 10/01/2015, 10/03/2017     Influenza,seasonal, Inj IIV3 10/05/2010, 10/13/2011     Td, Adult, Absorbed 09/01/2003     Tdap 10/15/2012           Electronically signed by Audra Murray MD 06/27/18 1:27 PM

## 2021-06-18 NOTE — PATIENT INSTRUCTIONS - HE
Patient Instructions by Destini Triplett FNP at 11/25/2020  9:20 AM     Author: Destini Triplett FNP Service: -- Author Type: Nurse Practitioner    Filed: 11/25/2020  9:47 AM Encounter Date: 11/25/2020 Status: Signed    : Destini Triplett FNP (Nurse Practitioner)       Patient Education     Diverticulitis    Some people get pouches along the wall of the colon as they get older. The pouches, called diverticuli, usually cause no symptoms. If the pouches become blocked, you can get an infection. This infection is called diverticulitis. It causes pain in your lower abdomen and fever. If not treated, it can become a serious condition, causing an abscess to form inside the pouch. The abscess may block the intestinal tract even or rupture, spreading infection throughout the abdomen.  When treatment is started early, oral antibiotics alone may be enough to cure diverticulitis. This method is tried first. But, if you don't improve or if your condition gets worse while using oral antibiotics, you may need to be admitted to the hospital for IV antibiotics. Severe cases may require surgery.  Home care  The following guidelines will help you care for yourself at home:    During the acute illness, rest and follow your healthcare provider's instructions about diet. Sometimes you will need to follow a clear liquid diet to rest your bowel. Once your symptoms are better, you may be told to follow a low-fiber diet for some time. Include foods like:  ? Flake cereal, mashed potatoes, pancakes, waffles, pasta, white bread, rice, applesauce, bananas, eggs, fish, poultry, tofu, and cooked soft vegetables    Take antibiotics exactly as instructed. Don't miss any doses or stop taking the medication, even if you feel better.    Monitor your temperature and tell your healthcare provider if you have rising temperatures.  Preventing future attacks  Once you have an episode of diverticulitis, you are at risk for having it again.  After you have recovered from this episode, you may be able to lower your risk by eating a high-fiber diet (20 gm/day to 35 gm/day of fiber). This cleans out the colon pouches that already exist and may prevent new ones from forming. Foods high in fiber include fresh fruits and edible peelings, raw or lightly cooked vegetables, whole grain cereals and breads, dried beans and peas, and bran.  Other steps that can help prevent future attacks include:    Take your medicines, such as antibiotics, as your healthcare provider says.    Drink 6 to 8 glasses of water every day, unless told otherwise.    Use a heating pad or hot water bottle to help abdominal cramping or pain.    Begin an exercise program. Ask your healthcare provider how to get started. You can benefit from simple activities such as walking or gardening.    Treat diarrhea with a bland diet. Start with liquids only; then slowly add fiber over time.    Watch for changes in your bowel movements (constipation to diarrhea). Avoid constipation by eating a high fiber diet and taking a stool softener if needed.    Get plenty of rest and sleep.  Follow-up care  Follow up with your healthcare provider as advised or sooner if you are not getting better in the next 2 days.  When to seek medical advice  Call your healthcare provider right away if any of these occur:    Fever of 100.4 F (38 C) or higher, or as directed by your healthcare provider    Repeated vomiting or swelling of the abdomen    Weakness, dizziness, light-headedness    Pain in your abdomen that gets worse, severe, or spreads to your back    Pain that moves to the right lower abdomen    Rectal bleeding (stools that are red, black or maroon color)    Unexpected vaginal bleeding  Date Last Reviewed: 9/1/2016 2000-2017 [employer]. [address] [place] [address]. All rights reserved. This information is not intended as a substitute for professional medical care. Always follow your  healthcare professional's instructions.

## 2021-06-20 NOTE — PROGRESS NOTES
Preoperative Exam    Scheduled Procedure: cheilectomy of the 1st MTPJ  Surgery Date:  9/10/18  Surgery Location: [facility]    Surgeon:  Javier Paniagua    Increasing pain right great toe with limited movement. Not able to comfortably wear dress shoes or go hiking    Assessment/Plan:     1. Hallux limitus of right foot  Great toe    2. Preoperative examination           Surgical Procedure Risk: Low (reported cardiac risk generally < 1%)  Have you had prior anesthesia?: Yes  Have you or any family members had a previous anesthesia reaction:  No  Do you or any family members have a history of a clotting or bleeding disorder?: unknown- adopted  Cardiac Risk Assessment: no increased risk for major cardiac complications    Patient approved for surgery with general or local anesthesia.         Functional Status: Independent  Patient plans to recover at home with family.     Subjective:      Venancio Baxter is a 53 y.o. male who presents for a preoperative consultation.       All other systems reviewed and are negative, other than those listed in the HPI.    Pertinent History  Do you have difficulty breathing or chest pain after walking up a flight of stairs: No  History of obstructive sleep apnea: No  Steroid use in the last 6 months: No  Frequent Aspirin/NSAID use: No  Prior Blood Transfusion: No  Prior Blood Transfusion Reaction: No  If for some reason prior to, during or after the procedure, if it is medically indicated, would you be willing to have a blood transfusion?:  There is no transfusion refusal.    Current Outpatient Prescriptions   Medication Sig Dispense Refill     ascorbic acid (VITAMIN C) 1,000 mg TbER Take 2,000 mg by mouth 2 (two) times a day.       cholecalciferol, vitamin D3, (VITAMIN D3) 2,000 unit Tab Take 4,000 Units by mouth.       ibuprofen (ADVIL,MOTRIN) 200 MG tablet Take 200 mg by mouth every 6 (six) hours as needed for pain.       multivitamin therapeutic (THERAGRAN) tablet  "Take 1 tablet by mouth daily.       NAD, REDUCED, DISODIUM, BULK, MISC Use As Directed.       EPINEPHrine (EPIPEN/ADRENACLICK) 0.3 mg/0.3 mL injection Inject 0.3 mL (0.3 mg total) as directed as needed for anaphylaxis. Inject into thigh. 2 Pre-filled Pen Syringe 1     No current facility-administered medications for this visit.         No Known Allergies    Patient Active Problem List   Diagnosis     Hypogonadism male     Multiple Renal Cysts     Diverticulosis     Basal Cell Adenocarcinoma     Hemorrhoids     Wasp sting-induced anaphylaxis     Fibroma of tongue       Past Medical History:   Diagnosis Date     Hypogonadism in male        Past Surgical History:   Procedure Laterality Date     HERNIA REPAIR      right inguinal; age 14, recurred 2000 @[place]     [place] ABDOMEN SURGERY PROC UNLISTED      Description: Hernia Repair;  Recorded: 07/16/2008;     [place] REMOVAL SYNOVIAL CYST,KNEE      Description: Excision Of Baker's Cyst;  Recorded: 09/24/2014;  Comments: X3       Social History     Social History     Marital status:      Spouse name: N/A     Number of children: 2     Years of education: N/A     Occupational History      [employer]     Social History Main Topics     Smoking status: Never Smoker     Smokeless tobacco: Never Used     Alcohol use Yes      Comment: occasional     Drug use: No     Sexual activity: Yes     Birth control/ protection: None     Other Topics Concern     Not on file     Social History Narrative    [employer]       Patient Care Team:  Audra Murray MD as PCP - General   Javier Paniagua DPM          Objective:     Vitals:    09/05/18 1339   BP: 108/72   Pulse: (!) 56   Weight: 120 lb 4.8 oz (54.6 kg)   Height: 5' 7\" (1.702 m)         Physical Exam:  General Appearance: Alert, cooperative, no distress, appears stated age  Head: Normocephalic, without obvious abnormality, atraumatic  Eyes: PERRL, conjunctiva/corneas clear, EOM's intact  Ears: Normal TM's and external " ear canals, both ears  Nose: Nares normal, septum midline,mucosa normal, no drainage  Throat: Lips, mucosa, and tongue normal, scarring from fibroid removal looks very good; teeth and gums normal  Neck: Supple, symmetrical, trachea midline, no adenopathy;  thyroid: not enlarged, symmetric, no tenderness/mass/nodules   Back: Symmetric, no curvature, ROM normal, no CVA tenderness  Lungs: Clear to auscultation bilaterally, respirations unlabored  Heart: Regular rate and rhythm, S1 and S2 normal, no murmur, rub, or gallop, Abdomen: Soft, non-tender, bowel sounds active all four quadrants,  no masses, no organomegaly  Extremities: Extremities normal, atraumatic, no cyanosis or edema. See Dr. Paniagua' notes regarding X-ray exam and clinical exam regarding right great toe  Skin: Skin color, texture, turgor normal, no rashes or lesions  Lymph nodes: Cervical, supraclavicular, and axillary nodes normal  Neurologic: Normal     There are no Patient Instructions on file for this visit.        Labs:  None indicated    Immunization History   Administered Date(s) Administered     Influenza, seasonal,quad inj 36+ mos 11/07/2016     Influenza, seasonal,quad inj 6-35 mos 12/10/2009     Influenza,seasonal quad, PF 09/24/2014     Influenza,seasonal quad, PF, 36+MOS 09/25/2013, 10/01/2015, 10/03/2017     Influenza,seasonal, Inj IIV3 10/05/2010, 10/13/2011     Td, Adult, Absorbed 09/01/2003     Tdap 10/15/2012           Electronically signed by Audra Murray MD 09/05/18 1:31 PM

## 2021-07-03 NOTE — ADDENDUM NOTE
Addendum Note by Audra Murray MD at 1/7/2021  5:10 PM     Author: Audra Murray MD Service: -- Author Type: Physician    Filed: 1/27/2021  9:12 AM Encounter Date: 1/7/2021 Status: Signed    : Audra Murray MD (Physician)    Addended by: AUDRA MURRAY on: 1/27/2021 09:12 AM        Modules accepted: Orders

## 2021-09-12 ENCOUNTER — HEALTH MAINTENANCE LETTER (OUTPATIENT)
Age: 56
End: 2021-09-12

## 2022-02-27 ENCOUNTER — HEALTH MAINTENANCE LETTER (OUTPATIENT)
Age: 57
End: 2022-02-27

## 2022-06-04 ENCOUNTER — HOSPITAL ENCOUNTER (EMERGENCY)
Facility: HOSPITAL | Age: 57
Discharge: HOME | End: 2022-06-04
Attending: EMERGENCY MEDICINE | Admitting: EMERGENCY MEDICINE
Payer: COMMERCIAL

## 2022-06-04 VITALS
HEART RATE: 55 BPM | BODY MASS INDEX: 18.83 KG/M2 | HEIGHT: 67 IN | RESPIRATION RATE: 17 BRPM | TEMPERATURE: 98.8 F | SYSTOLIC BLOOD PRESSURE: 108 MMHG | WEIGHT: 120 LBS | OXYGEN SATURATION: 98 % | DIASTOLIC BLOOD PRESSURE: 65 MMHG

## 2022-06-04 DIAGNOSIS — S05.01XA ABRASION OF RIGHT CORNEA, INITIAL ENCOUNTER: ICD-10-CM

## 2022-06-04 DIAGNOSIS — H43.811 POSTERIOR VITREOUS DETACHMENT, RIGHT: ICD-10-CM

## 2022-06-04 LAB
C REACTIVE PROTEIN LHE: <0.1 MG/DL (ref 0–0.8)
ERYTHROCYTE [SEDIMENTATION RATE] IN BLOOD BY WESTERGREN METHOD: 2 MM/HR (ref 0–15)

## 2022-06-04 PROCEDURE — 99284 EMERGENCY DEPT VISIT MOD MDM: CPT | Mod: 25

## 2022-06-04 PROCEDURE — 86140 C-REACTIVE PROTEIN: CPT | Performed by: EMERGENCY MEDICINE

## 2022-06-04 PROCEDURE — 36415 COLL VENOUS BLD VENIPUNCTURE: CPT | Performed by: EMERGENCY MEDICINE

## 2022-06-04 PROCEDURE — 250N000009 HC RX 250: Performed by: EMERGENCY MEDICINE

## 2022-06-04 PROCEDURE — 76512 OPH US DX B-SCAN: CPT

## 2022-06-04 PROCEDURE — 85652 RBC SED RATE AUTOMATED: CPT | Performed by: EMERGENCY MEDICINE

## 2022-06-04 RX ORDER — TETRACAINE HYDROCHLORIDE 5 MG/ML
1 SOLUTION OPHTHALMIC ONCE
Status: COMPLETED | OUTPATIENT
Start: 2022-06-04 | End: 2022-06-04

## 2022-06-04 RX ORDER — ERYTHROMYCIN 5 MG/G
0.5 OINTMENT OPHTHALMIC 4 TIMES DAILY
Qty: 3.5 G | Refills: 0 | Status: SHIPPED | OUTPATIENT
Start: 2022-06-04 | End: 2022-06-11

## 2022-06-04 RX ADMIN — FLUORESCEIN SODIUM 1 STRIP: 1 STRIP OPHTHALMIC at 15:39

## 2022-06-04 RX ADMIN — TETRACAINE HYDROCHLORIDE 1 DROP: 5 SOLUTION OPHTHALMIC at 15:40

## 2022-06-04 ASSESSMENT — VISUAL ACUITY
OD: 20/20;WITH CORRECTIVE LENSES
OS: 20/20;WITH CORRECTIVE LENSES

## 2022-06-04 NOTE — ED TRIAGE NOTES
Patient states that since Tuesday he noted a black floater to right side of his right eye did not think anything of it but it has not gone away he states it has also not gotten worse.

## 2022-06-04 NOTE — ED PROVIDER NOTES
Emergency Department Encounter     Evaluation Date & Time:   2022  2:34 PM    CHIEF COMPLAINT:  Eye Problem      Triage Note:Patient states that since Tuesday he noted a black floater to right side of his right eye did not think anything of it but it has not gone away he states it has also not gotten worse.             Impression and Plan       FINAL IMPRESSION:    ICD-10-CM    1. Posterior vitreous detachment, right  H43.811    2. Abrasion of right cornea, initial encounter  S05.01XA          ED COURSE & MEDICAL DECISION MAKIN:56 PM I met with the patient and discussed symptoms and history. PPE: Provider wore gloves, paper mask, and gown.  4:01 PM Spoke to Dr. Plummer, Ophthalmology, [facility].   4:05 PM Discussed discharge with the patient, he is agreeable with this plan.    57 year old male, history of skin cancer, diverticulosis and hypogonadism, who presents for evaluation of floaters in his right field of vision in the right eye, which have been constant since waking on Tuesday morning (5 days ago). He also reports a central vertical blurry area in his field of vision; the blurry area moves opposite of the direction that he moves his eye. No associated eye pain, but reports a mild scratchy sensation when moving his eye. No associated headache, extremity weakness / paresthesias.     He regularly wears glasses; no contact lenses. He had a short muscle eye surgery when he was in pre-school; no other eye surgeries.     On exam, PERRL with normal conjunctiva. EOMI without nystagmus. No visual field deficits.     Bedside ultrasound performed and demonstrated no findings to suggest retinal detachment.    Using the Woods lamp, there is uptake of fluorescein staining at ~6 o'clock inferior to the iris suggestive of a corneal abrasion.     Patient denies any associated headache.  CRP and ESR WNL (<0.1 and 2, respectively), thus temporal arteritis highly unlikely.    Neuro exam is normal without any  "neurologic complaints and I do not suspect stroke, central retinal artery / venous occlusion and do not think emergent head imaging is indicated.    We attempted to reach the on-call ophthalmologist with the patient's clinic, however they did not answer.  Our ophthalmologist was consulted and suggested that the patient has a posterior vitreous detachment.  He recommended close follow-up with the patient's ophthalmologist early this week.    Patient discharged to home with follow-up with his ophthalmologist on Monday.  He was given a prescription for erythromycin ointment to treat the corneal abrasion.  Return precautions provided.  Patient stable throughout ED course.      At the conclusion of the encounter I discussed the results of all the tests and the disposition. The questions were answered. The patient acknowledged understanding and was agreeable with the care plan.      MEDICATIONS GIVEN IN THE EMERGENCY DEPARTMENT:  Medications   fluorescein (FUL-SUZIE) ophthalmic strip 1 strip (1 strip Right Eye Given by Other 6/4/22 0720)   tetracaine (PONTOCAINE) 0.5 % ophthalmic solution 1 drop (1 drop Right Eye Given by Other 6/4/22 8990)       NEW PRESCRIPTIONS STARTED AT TODAY'S ED VISIT:  Discharge Medication List as of 6/4/2022  4:20 PM      START taking these medications    Details   erythromycin (ROMYCIN) 5 MG/GM ophthalmic ointment Place 0.5 inches into the right eye 4 times daily for 7 daysDisp-3.5 g, R-0Local Print             HPI     The history is provided by the patient. No  was used.        Venancio Baxter is a 57 year old male, history of skin cancer, diverticulosis and hypogonadism, who presents to this ED for evaluation of an eye problem.    The patient reports waking on Tuesday morning (5 days ago) with a floater in his right field of vision in the right eye that now appears to be fuzzy and black. He also reports a vertical blurry area in his field of vision that is \"dead center.\" " "These symptoms have been constant since Tuesday without improvement or worsening. He indicates that this blurry area moves opposite of the direction that he moves his eye. He denies associated eye pain, but said that there is a mild scratchy sensation when moving his eye. His vision is otherwise clear. He regularly wears glasses; no contact lenses. Additionally he reports that before waking up on Tuesday he had a \"fitful\" night of sleep and may have injured his eye that way. He reported a short muscle eye surgery when he was in pre-school; no other eye surgeries. He denies associated headache, extremity weakness / paresthesias, difficulty with speech, swallowing and ambulation.    He has otherwise been in his usual state of health and denies chest pain, shortness of breath, abdominal pain, N/V/D, cough, fever or other concerns.    REVIEW OF SYSTEMS:  All other systems reviewed and are negative.      Medical History     Past Medical History:   Diagnosis Date     Hypogonadism in male        Past Surgical History:   Procedure Laterality Date     C UNLISTED PROCEDURE, ABDOMEN/PERITONEUM/OMENTUM      Description: Hernia Repair;  Recorded: 07/16/2008;     HC REMOVAL SYNOVIAL CYST,KNEE      Description: Excision Of Baker's Cyst;  Recorded: 09/24/2014;  Comments: X3     HERNIA REPAIR      right inguinal; age 14, recurred 2000 @[facility]       Family History   Adopted: Yes       Social History     Tobacco Use     Smoking status: Never Smoker     Smokeless tobacco: Never Used   Substance Use Topics     Alcohol use: Yes     Comment: Alcoholic Drinks/day: occasional     Drug use: No       erythromycin (ROMYCIN) 5 MG/GM ophthalmic ointment  ascorbic acid (VITAMIN C) 1,000 mg TbER  cholecalciferol, vitamin D3, (VITAMIN D3) 2,000 unit Tab  EPINEPHrine (EPIPEN/ADRENACLICK/AUVI-Q) 0.3 mg/0.3 mL injection  ibuprofen (ADVIL,MOTRIN) 200 MG tablet  MEDICATION CANNOT BE REORDERED - PLEASE MANUALLY REORDER AND DISCONTINUE THE OLD " "ORDER  multivitamin therapeutic (THERAGRAN) tablet        Physical Exam     First Vitals:  Patient Vitals for the past 24 hrs:   BP Temp Temp src Pulse Resp SpO2 Height Weight   06/04/22 1600 108/65 -- -- 55 -- 98 % -- --   06/04/22 1545 108/68 -- -- 55 -- 97 % -- --   06/04/22 1530 109/69 -- -- 53 -- 97 % -- --   06/04/22 1515 114/68 -- -- 59 -- 98 % -- --   06/04/22 1500 119/80 -- -- 64 -- 97 % -- --   06/04/22 1445 117/76 -- -- 60 -- 97 % -- --   06/04/22 1358 (!) 144/78 98.8  F (37.1  C) Temporal 75 17 96 % 1.702 m (5' 7\") 54.4 kg (120 lb)       PHYSICAL EXAM:   Physical Exam    GENERAL: Awake, alert.  In no acute distress.   HEENT: Normocephalic, atraumatic. Pupils equal, round and reactive. Conjunctiva normal. EOMI without nystagmus. No visual field deficits. There is uptake of fluorescein staining at ~6 o'clock inferior to the iris.   NECK: No stridor.  PULMONARY: Symmetrical breath sounds without distress.  Lungs clear to auscultation bilaterally without wheezes, rhonchi or rales.  CARDIO: Regular rate and rhythm.  No significant murmur, rub or gallop.  Radial pulses strong and symmetrical.  ABDOMINAL: Abdomen soft, non-distended and non-tender to palpation.   EXTREMITIES: No lower extremity swelling or edema.      NEURO: Alert and oriented to person, place and time.  Cranial nerves II-XII intact.  Strength 5/5 BL upper and lower extremities with sensation to light touch grossly intact.  PSYCH: Normal mood and affect.  SKIN: No rashes.     Results     LAB:  All pertinent labs reviewed and interpreted  Labs Ordered and Resulted from Time of ED Arrival to Time of ED Departure   CRP INFLAMMATION - Normal       Result Value    CRP <0.1         PROCEDURES:  Procedures    PROCEDURE: Emergency Department Limited Bedside Screening Ultrasound of the Eye   INDICATIONS:  floaters   PROCEDURE PROVIDER:   Elizabeth Monroe    WINDOW: Right eye   FINDINGS: No evidence of retinal detachment   IMAGES SAVED AND STORED FOR " ARCHIVE AND REVIEW: No         I, Amelialong Daltontson, am serving as a scribe to document services personally performed by Elizabeth Monroe MD based on my observation and the provider's statements to me. I, Elizabeth Monroe MD attest that Amelia Maya is acting in a scribe capacity, has observed my performance of the services and has documented them in accordance with my direction.    Elizabeth Monroe MD  Emergency Medicine  [facility] EMERGENCY DEPARTMENT           Elizabeth Monroe MD  06/05/22 0761

## 2022-06-04 NOTE — ED NOTES
D/C instructions went over with patient including medication, restrictions and follow upappts. Pt voiced understanding.

## 2022-06-04 NOTE — DISCHARGE INSTRUCTIONS
Please follow-up with your eye doctor on Monday; call to arrange appointment.    Return to the ER for worsening symptoms, worsening vision changes, sudden onset or severe headache, focal neurologic deficit (for example, facial droop or right arm weakness), persistent nausea / vomiting, fever or other concerns.

## 2022-11-19 ENCOUNTER — HEALTH MAINTENANCE LETTER (OUTPATIENT)
Age: 57
End: 2022-11-19

## 2023-02-08 ENCOUNTER — TRANSFERRED RECORDS (OUTPATIENT)
Dept: HEALTH INFORMATION MANAGEMENT | Facility: CLINIC | Age: 58
End: 2023-02-08

## 2023-04-09 ENCOUNTER — HEALTH MAINTENANCE LETTER (OUTPATIENT)
Age: 58
End: 2023-04-09

## 2023-08-21 ENCOUNTER — TRANSFERRED RECORDS (OUTPATIENT)
Dept: HEALTH INFORMATION MANAGEMENT | Facility: CLINIC | Age: 58
End: 2023-08-21
Payer: COMMERCIAL

## 2024-06-15 ENCOUNTER — HEALTH MAINTENANCE LETTER (OUTPATIENT)
Age: 59
End: 2024-06-15

## 2024-08-29 ENCOUNTER — TRANSFERRED RECORDS (OUTPATIENT)
Dept: HEALTH INFORMATION MANAGEMENT | Facility: CLINIC | Age: 59
End: 2024-08-29
Payer: COMMERCIAL